# Patient Record
Sex: MALE | Race: WHITE | Employment: OTHER | URBAN - METROPOLITAN AREA
[De-identification: names, ages, dates, MRNs, and addresses within clinical notes are randomized per-mention and may not be internally consistent; named-entity substitution may affect disease eponyms.]

---

## 2017-01-12 ENCOUNTER — TELEPHONE (OUTPATIENT)
Dept: FAMILY MEDICINE CLINIC | Age: 82
End: 2017-01-12

## 2018-03-05 ENCOUNTER — OFFICE VISIT (OUTPATIENT)
Dept: FAMILY MEDICINE CLINIC | Age: 83
End: 2018-03-05

## 2018-03-05 VITALS
RESPIRATION RATE: 44 BRPM | TEMPERATURE: 100 F | SYSTOLIC BLOOD PRESSURE: 120 MMHG | HEART RATE: 120 BPM | DIASTOLIC BLOOD PRESSURE: 60 MMHG

## 2018-03-05 DIAGNOSIS — R50.9 FEVER, UNSPECIFIED FEVER CAUSE: Primary | ICD-10-CM

## 2018-03-05 DIAGNOSIS — R41.82 ALTERED MENTAL STATUS, UNSPECIFIED ALTERED MENTAL STATUS TYPE: ICD-10-CM

## 2018-03-05 DIAGNOSIS — J22 LOWER RESPIRATORY TRACT INFECTION: ICD-10-CM

## 2018-03-05 DIAGNOSIS — R07.81 RIB PAIN ON RIGHT SIDE: ICD-10-CM

## 2018-03-05 PROCEDURE — 99215 OFFICE O/P EST HI 40 MIN: CPT | Performed by: NURSE PRACTITIONER

## 2018-03-05 RX ORDER — FOLIC ACID 1 MG/1
TABLET ORAL
COMMUNITY
End: 2018-03-05 | Stop reason: ALTCHOICE

## 2018-03-05 ASSESSMENT — PATIENT HEALTH QUESTIONNAIRE - PHQ9
SUM OF ALL RESPONSES TO PHQ QUESTIONS 1-9: 0
1. LITTLE INTEREST OR PLEASURE IN DOING THINGS: 0
2. FEELING DOWN, DEPRESSED OR HOPELESS: 0
SUM OF ALL RESPONSES TO PHQ9 QUESTIONS 1 & 2: 0

## 2018-03-05 ASSESSMENT — ENCOUNTER SYMPTOMS
COUGH: 1
CHEST TIGHTNESS: 0
RHINORRHEA: 1
SHORTNESS OF BREATH: 1
ABDOMINAL PAIN: 0
DIARRHEA: 0

## 2018-03-05 NOTE — PROGRESS NOTES
Mucosal edema and rhinorrhea (clear copious) present. Mouth/Throat: Dental caries present. Posterior oropharyngeal edema and posterior oropharyngeal erythema present. Eyes: EOM and lids are normal. Pupils are equal, round, and reactive to light. Right conjunctiva is injected. Left conjunctiva is injected. No scleral icterus. Cardiovascular: S1 normal and S2 normal.  An irregularly irregular rhythm present. No extrasystoles are present. Tachycardia present. Pulmonary/Chest: He has decreased breath sounds (throughout, she very shallow breaths). He has rhonchi (throughout all lung fields). He exhibits bony tenderness and retraction. Abdominal: There is tenderness. There is no guarding. Neurological: He is alert. Diagnosis    ICD-10-CM ICD-9-CM    1. Fever, unspecified fever cause R50.9 780.60    2. Altered mental status, unspecified altered mental status type R41.82 780.97    3. Lower respiratory tract infection J22 519.8    4. Rib pain on right side R07.81 786.50         Plan    911 was called and patient was transported to the emergency room for evaluation  Information was provided to the officer responding regarding confusion and his vehicle      No orders of the defined types were placed in this encounter. No orders of the defined types were placed in this encounter. Return in about 1 week (around 3/12/2018).

## 2020-01-01 ENCOUNTER — OFFICE VISIT (OUTPATIENT)
Dept: FAMILY MEDICINE CLINIC | Age: 85
End: 2020-01-01
Payer: MEDICARE

## 2020-01-01 VITALS
HEART RATE: 77 BPM | SYSTOLIC BLOOD PRESSURE: 144 MMHG | DIASTOLIC BLOOD PRESSURE: 80 MMHG | OXYGEN SATURATION: 98 % | BODY MASS INDEX: 17.88 KG/M2 | WEIGHT: 132 LBS | HEIGHT: 72 IN

## 2020-01-01 PROCEDURE — 1111F DSCHRG MED/CURRENT MED MERGE: CPT | Performed by: FAMILY MEDICINE

## 2020-01-01 PROCEDURE — 99214 OFFICE O/P EST MOD 30 MIN: CPT | Performed by: FAMILY MEDICINE

## 2020-01-01 RX ORDER — OLANZAPINE 5 MG/1
TABLET, ORALLY DISINTEGRATING ORAL
COMMUNITY
Start: 2020-01-01

## 2020-01-01 ASSESSMENT — PATIENT HEALTH QUESTIONNAIRE - PHQ9
SUM OF ALL RESPONSES TO PHQ9 QUESTIONS 1 & 2: 0
1. LITTLE INTEREST OR PLEASURE IN DOING THINGS: 0
SUM OF ALL RESPONSES TO PHQ QUESTIONS 1-9: 0
2. FEELING DOWN, DEPRESSED OR HOPELESS: 0
SUM OF ALL RESPONSES TO PHQ QUESTIONS 1-9: 0

## 2020-01-01 ASSESSMENT — ENCOUNTER SYMPTOMS
SHORTNESS OF BREATH: 0
COUGH: 0

## 2020-01-09 PROBLEM — D72.829 LEUKOCYTOSIS: Status: ACTIVE | Noted: 2018-03-05

## 2020-01-09 PROBLEM — G93.40 ACUTE ENCEPHALOPATHY: Status: ACTIVE | Noted: 2018-03-05

## 2020-01-09 PROBLEM — J15.9 COMMUNITY ACQUIRED BACTERIAL PNEUMONIA: Status: ACTIVE | Noted: 2018-03-05

## 2020-01-09 PROBLEM — J96.01 ACUTE RESPIRATORY FAILURE WITH HYPOXIA (HCC): Status: ACTIVE | Noted: 2018-03-06

## 2020-01-09 NOTE — PROGRESS NOTES
on file    Intimate partner violence:     Fear of current or ex partner: Not on file     Emotionally abused: Not on file     Physically abused: Not on file     Forced sexual activity: Not on file   Other Topics Concern    Not on file   Social History Narrative    Not on file       Family History   Problem Relation Age of Onset    Cancer Neg Hx     Diabetes Neg Hx     Heart Disease Neg Hx        Vitals:    01/09/20 1448   BP: (!) 144/80   Pulse:    SpO2:        Wt Readings from Last 3 Encounters:   01/09/20 132 lb (59.9 kg)   11/07/16 133 lb (60.3 kg)   10/27/16 133 lb 8 oz (60.6 kg)       Review of Systems   Respiratory: Negative for cough and shortness of breath. Cardiovascular: Negative for chest pain and palpitations. Musculoskeletal: Positive for arthralgias. Right knee aches some but his reports are varying and low key   Psychiatric/Behavioral:        Gets minimally irritable and mostly daughter can redirect him    Has not needed the olanzapine except first day home       Objective:   Physical Exam  HENT:      Ears:      Comments: [place]  Cardiovascular:      Rate and Rhythm: Normal rate and regular rhythm. Heart sounds: Normal heart sounds. Pulmonary:      Comments: Senile crackles    Abdominal:      General: Abdomen is flat. Bowel sounds are normal.   Musculoskeletal:      Right lower leg: No edema. Left lower leg: No edema. Comments: Crepitant right knee. No laxity   Psychiatric:         Behavior: Behavior normal.      Comments: Repetitive. Memory seems short. Assessment:      Pneumonia  Deconditioning        Plan:     Going to have PT / OT and Home nursing. Agree    Monitor BP    Follow 2  months    Current Outpatient Medications   Medication Sig Dispense Refill    OLANZapine zydis (ZYPREXA) 5 MG disintegrating tablet       Handicap Placard MISC by Does not apply route Patient needs handicap placard starting 10/28/16 for 5 years.  1 each 0     No current facility-administered medications for this visit.

## 2020-06-03 PROBLEM — H91.90 HOH (HARD OF HEARING): Status: ACTIVE | Noted: 2020-01-01

## 2020-06-03 PROBLEM — G30.1 LATE ONSET ALZHEIMER DISEASE (HCC): Status: ACTIVE | Noted: 2020-01-01

## 2020-06-03 PROBLEM — F02.80 LATE ONSET ALZHEIMER DISEASE (HCC): Status: ACTIVE | Noted: 2020-01-01

## 2020-11-24 ENCOUNTER — TELEPHONE (OUTPATIENT)
Dept: FAMILY MEDICINE CLINIC | Age: 85
End: 2020-11-24

## 2020-11-24 NOTE — TELEPHONE ENCOUNTER
Called to Schedule AWV for [facility] and was told by Brice Calderon that Annika Tiffany has passed away 11/2/2020

## 2020-11-24 NOTE — TELEPHONE ENCOUNTER
Please share this information with Paul Bradford, as she saw the patient for a while a couple years ago.